# Patient Record
Sex: FEMALE | Race: BLACK OR AFRICAN AMERICAN | Employment: PART TIME | URBAN - METROPOLITAN AREA
[De-identification: names, ages, dates, MRNs, and addresses within clinical notes are randomized per-mention and may not be internally consistent; named-entity substitution may affect disease eponyms.]

---

## 2022-03-25 ENCOUNTER — HOSPITAL ENCOUNTER (EMERGENCY)
Age: 57
Discharge: HOME | End: 2022-03-25
Attending: EMERGENCY MEDICINE
Payer: COMMERCIAL

## 2022-03-25 VITALS
HEART RATE: 89 BPM | BODY MASS INDEX: 39.27 KG/M2 | SYSTOLIC BLOOD PRESSURE: 165 MMHG | TEMPERATURE: 99.3 F | RESPIRATION RATE: 18 BRPM | DIASTOLIC BLOOD PRESSURE: 103 MMHG | HEIGHT: 64 IN | OXYGEN SATURATION: 100 % | WEIGHT: 230 LBS

## 2022-03-25 DIAGNOSIS — S43.422A SPRAIN OF LEFT ROTATOR CUFF CAPSULE, INITIAL ENCOUNTER: Primary | ICD-10-CM

## 2022-03-25 PROCEDURE — 99283 EMERGENCY DEPT VISIT LOW MDM: CPT

## 2022-03-25 RX ORDER — HYDROCHLOROTHIAZIDE 25 MG/1
TABLET ORAL
COMMUNITY

## 2022-03-25 RX ORDER — KETOROLAC TROMETHAMINE 10 MG/1
10 TABLET, FILM COATED ORAL
Qty: 20 TABLET | Refills: 0 | Status: SHIPPED | OUTPATIENT
Start: 2022-03-25 | End: 2022-03-30

## 2022-03-25 NOTE — ED PROVIDER NOTES
EMERGENCY DEPARTMENT HISTORY AND PHYSICAL EXAM      Date: 3/25/2022  Patient Name: Nichole Cee    History of Presenting Illness     Chief Complaint   Patient presents with    Shoulder Pain       History Provided By: Patient    HPI: Nichole Cee, 64 y.o. female with a past medical history significant hypertension presents to the ED with cc of 3 weeks history of left lateral shoulder pain. Patient denies trauma. Patient reports the pain is sharp, worse with movement or palpation, improved with rest.  Patient denies numbness or tingling. There are no other complaints, changes, or physical findings at this time. PCP: Grisel Vu MD    No current facility-administered medications on file prior to encounter. Current Outpatient Medications on File Prior to Encounter   Medication Sig Dispense Refill    hydroCHLOROthiazide (HYDRODIURIL) 25 mg tablet 1 tablet in the morning         Past History     Past Medical History:  Past Medical History:   Diagnosis Date    Hypertension        Past Surgical History:  History reviewed. No pertinent surgical history. Family History:  History reviewed. No pertinent family history. Social History:  Social History     Tobacco Use    Smoking status: Never Smoker    Smokeless tobacco: Never Used   Substance Use Topics    Alcohol use: Yes     Comment: occassional    Drug use: Never       Allergies:  No Known Allergies      Review of Systems   Review of Systems   Constitutional: Negative for chills and fever. HENT: Negative for sinus pressure and sinus pain. Eyes: Negative for photophobia and redness. Respiratory: Negative for shortness of breath and wheezing. Cardiovascular: Negative for chest pain and palpitations. Gastrointestinal: Negative for abdominal pain and nausea. Genitourinary: Negative for flank pain and hematuria. Musculoskeletal: Positive for left lateral shoulder pain, negative for gait problem.    Skin: Negative for color change and pallor. Neurological: Negative for dizziness and weakness. Review of Systems    Physical Exam   Physical Exam  Constitutional:       General: No acute distress. Appearance: Normal appearance. Not toxic-appearing. HENT:      Head: Normocephalic and atraumatic. Nose: Nose normal.      Mouth/Throat:      Mouth: Mucous membranes are moist.   Eyes:      Extraocular Movements: Extraocular movements intact. Pupils: Pupils are equal, round, and reactive to light. Cardiovascular:      Rate and Rhythm: Normal rate. Pulses: Normal pulses. Pulmonary:      Effort: Pulmonary effort is normal.      Breath sounds: No stridor. Abdominal:      General: Abdomen is flat. There is no distension. Musculoskeletal:         General: Normal range of motion. Left upper extremity: Tenderness to palpation over the insertion of the rotator cuff muscles. Pain elicited with movement of joint. Neurovascularly intact distally. Skin:     General: Skin is warm and dry. Capillary Refill: Capillary refill takes less than 2 seconds. Neurological:      General: No focal deficit present. Mental Status: Aert and oriented to person, place, and time. Psychiatric:         Mood and Affect: Mood normal.         Behavior: Behavior normal.       Physical Exam    Lab and Diagnostic Study Results     Labs -   No results found for this or any previous visit (from the past 12 hour(s)). Radiologic Studies -   @lastxrresult@  CT Results  (Last 48 hours)    None        CXR Results  (Last 48 hours)    None            Medical Decision Making   - I am the first provider for this patient. - I reviewed the vital signs, available nursing notes, past medical history, past surgical history, family history and social history. - Initial assessment performed. The patients presenting problems have been discussed, and they are in agreement with the care plan formulated and outlined with them.   I have encouraged them to ask questions as they arise throughout their visit. Vital Signs-Reviewed the patient's vital signs. Patient Vitals for the past 12 hrs:   Temp Pulse Resp BP SpO2   03/25/22 0821 99.3 °F (37.4 °C) 89 18 (!) 165/103 100 %         Disposition   Disposition: DC- Adult Discharges: All of the diagnostic tests were reviewed and questions answered. Diagnosis, care plan and treatment options were discussed. The patient understands the instructions and will follow up as directed. The patients results have been reviewed with them. They have been counseled regarding their diagnosis. The patient verbally convey understanding and agreement of the signs, symptoms, diagnosis, treatment and prognosis and additionally agrees to follow up as recommended with their PCP in 24 - 48 hours. They also agree with the care-plan and convey that all of their questions have been answered. I have also put together some discharge instructions for them that include: 1) educational information regarding their diagnosis, 2) how to care for their diagnosis at home, as well a 3) list of reasons why they would want to return to the ED prior to their follow-up appointment, should their condition change. Discharged    DISCHARGE PLAN:  1. Current Discharge Medication List      CONTINUE these medications which have NOT CHANGED    Details   hydroCHLOROthiazide (HYDRODIURIL) 25 mg tablet 1 tablet in the morning           2. Follow-up Information     Follow up With Specialties Details Why Contact Info    Rodrick Valderrama MD Internal Medicine  As needed [phone] [facility]      Vikram Dang MD Orthopedic Surgery  As needed [place] [address]  [phone]          3. Return to ED if worse   4.    Current Discharge Medication List      START taking these medications    Details   ketorolac (TORADOL) 10 mg tablet Take 1 Tablet by mouth every six (6) hours as needed for Pain for up to 5 days. Qty: 20 Tablet, Refills: 0  Start date: 3/25/2022, End date: 3/30/2022               Diagnosis     Clinical Impression:   1. Sprain of left rotator cuff capsule, initial encounter        Attestations:    Leti Mendez MD    Please note that this dictation was completed with [employer], the computer voice recognition software. Quite often unanticipated grammatical, syntax, homophones, and other interpretive errors are inadvertently transcribed by the computer software. Please disregard these errors. Please excuse any errors that have escaped final proofreading. Thank you.

## 2022-03-25 NOTE — Clinical Note
[facility] EMERGENCY DEPARTMENT  [address] [place] [address]  [phone]    Work/School Note    Date: 3/25/2022    To Whom It May concern:    Ramandeep Malcolm was seen and treated today in the emergency room by the following provider(s):  Attending Provider: Malena Moseley MD.      Ramandeep Malcolm is excused from work/school on 3/25/2022 through 3/27/2022. She is medically clear to return to work/school on 3/28/2022.          Sincerely,          Nataly Alonzo MD

## 2022-03-25 NOTE — ED TRIAGE NOTES
x3 wks left shoulder pain, hurts to lift arm and to extend arm forward, doesn't hurt to pull arm towards back, dull achy pain, 10/10 constants but hurts worse to push arm forward and lifting arm up.   PMS good, equal strength and , radial pulse 2+, denies trauma, hasn't taken anything for pain

## 2024-01-12 ENCOUNTER — HOSPITAL ENCOUNTER (OUTPATIENT)
Facility: HOSPITAL | Age: 59
Setting detail: RECURRING SERIES
Discharge: HOME | End: 2024-01-15
Payer: COMMERCIAL

## 2024-01-12 PROCEDURE — 97161 PT EVAL LOW COMPLEX 20 MIN: CPT | Performed by: PHYSICAL THERAPIST

## 2024-01-12 NOTE — THERAPY EVALUATION
[facility]  [address], [address]  [place] [address]  Phone: [phone]    Fax: [phone]         PHYSICAL THERAPY - MEDICARE EVALUATION/PLAN OF CARE NOTE (updated 3/23)      Date: 2024          Patient Name:  Shelly Cameron :  1965   Medical   Diagnosis:  Neck pain [M54.2]  Other low back pain [M54.59] Treatment Diagnosis:  M54.2  NECK PAIN and M54.59  OTHER LOWER BACK PAIN    Referral Source:  Hardik Emanuel MD Provider #:  [id number]                Insurance: Payor: [employer] / Plan: [employer] [facility] / Product Type: *No Product type* /      Patient  verified yes     Visit #   Current  / Total 1 To be determined   Time   In / Out 1:30 pm  205 pm   Total Treatment Time 35 minutes   Total Timed Codes 0   1:1 Treatment Time 0       BC Totals Reminder:  bill using total billable   min of TIMED therapeutic procedures and modalities.   8-22 min = 1 unit; 23-37 min = 2 units; 38-52 min = 3 units;  53-67 min = 4 units; 68-82 min = 5 units           SUBJECTIVE  Pain Level (0-10 scale): 6  []constant []intermittent []improving []worsening [x]no change since onset    Any medication changes, allergies to medications, adverse drug reactions, diagnosis change, or new procedure performed?: [x] No    [] Yes (see summary sheet for update)  Medications: Verified on Patient Summary List    Subjective functional status/changes:     Chief complaint of central neck and back pain.  Described pain as a dull ache, \"tight\".  Some difficulty getting to sleep.  Denies numbness or tingling.   Right hand dominate.        Aggravating factors:  sitting > 30 minutes.   Relieving factors: heating pad, muscle relaxer.      Start of Care: 2024  Onset Date: 2024.    Current symptoms/Complaints: see above.   Mechanism of Injury: see above.   PLOF: no difficulties.   Limitations to PLOF/Activity or Recreational Limitations: limited sitting and walking.

## 2024-01-17 ENCOUNTER — HOSPITAL ENCOUNTER (OUTPATIENT)
Facility: HOSPITAL | Age: 59
Setting detail: RECURRING SERIES
Discharge: HOME | End: 2024-01-20
Payer: COMMERCIAL

## 2024-01-17 PROCEDURE — 97110 THERAPEUTIC EXERCISES: CPT | Performed by: PHYSICAL THERAPIST

## 2024-01-17 PROCEDURE — 97140 MANUAL THERAPY 1/> REGIONS: CPT | Performed by: PHYSICAL THERAPIST

## 2024-01-17 NOTE — PROGRESS NOTES
PHYSICAL THERAPY - MEDICARE DAILY TREATMENT NOTE (updated 3/23)      Date: 2024          Patient Name:  Shelly Cameron :  1965   Medical   Diagnosis:  Neck pain [M54.2]  Other low back pain [M54.59] Treatment Diagnosis:  M54.2  NECK PAIN and M54.59  OTHER LOWER BACK PAIN    Referral Source:  Hardik Emanuel MD Insurance:   Payor: [facility] / Plan: [facility] [place] / Product Type: *No Product type* /                     Patient  verified yes     Visit #   Current  / Total 2 4   Time   In / Out 8 am  850 am    Total Treatment Time 50 minutes.    Total Timed Codes 40   1:1 Treatment Time 40      [facility] Totals Reminder:  bill using total billable   min of TIMED therapeutic procedures and modalities.   8-22 min = 1 unit; 23-37 min = 2 units; 38-52 min = 3 units; 53-67 min = 4 units; 68-82 min = 5 units            SUBJECTIVE    Pain Level (0-10 scale): 7    Any medication changes, allergies to medications, adverse drug reactions, diagnosis change, or new procedure performed?: [x] No    [] Yes (see summary sheet for update)  Medications: Verified on Patient Summary List    Subjective functional status/changes:     Not use to exercising.   Reports she has been trying to get out of chair more while at work.     OBJECTIVE      Therapeutic Procedures:  Tx Min Billable or 1:1 Min (if diff from Tx Min) Procedure, Rationale, Specifics   10  02072 Manual Therapy (timed):  decrease pain and increase ROM to improve patient's ability to progress to PLOF and address remaining functional goals.  The manual therapy interventions were performed at a separate and distinct time from the therapeutic activities interventions . (see flow sheet as applicable)     Details if applicable:  STM/PA glides to b/l TL paraspinals.    30  36581 Therapeutic Exercise (timed):  increase ROM, strength, coordination, balance, and proprioception to improve patient's ability to progress to PLOF and address remaining functional goals. (see flow

## 2024-01-23 ENCOUNTER — HOSPITAL ENCOUNTER (OUTPATIENT)
Facility: HOSPITAL | Age: 59
Setting detail: RECURRING SERIES
Discharge: HOME | End: 2024-01-26
Payer: COMMERCIAL

## 2024-01-23 PROCEDURE — 97140 MANUAL THERAPY 1/> REGIONS: CPT

## 2024-01-23 PROCEDURE — 97110 THERAPEUTIC EXERCISES: CPT

## 2024-01-23 NOTE — PROGRESS NOTES
PHYSICAL THERAPY - MEDICARE DAILY TREATMENT NOTE (updated 3/23)      Date: 2024          Patient Name:  Shelly Cameron :  1965   Medical   Diagnosis:  Neck pain [M54.2]  Other low back pain [M54.59] Treatment Diagnosis:  M54.2  NECK PAIN and M54.59  OTHER LOWER BACK PAIN    Referral Source:  Hardik Emanuel MD Insurance:   Payor: [facility] / Plan: [facility] [place] / Product Type: *No Product type* /                     Patient  verified yes     Visit #   Current  / Total 3 4   Time   In / Out 145 240   Total Treatment Time 50 minutes.    Total Timed Codes 41   1:1 Treatment Time 41      [facility] Totals Reminder:  bill using total billable   min of TIMED therapeutic procedures and modalities.   8-22 min = 1 unit; 23-37 min = 2 units; 38-52 min = 3 units; 53-67 min = 4 units; 68-82 min = 5 units            SUBJECTIVE    Pain Level (0-10 scale): 4    Any medication changes, allergies to medications, adverse drug reactions, diagnosis change, or new procedure performed?: [x] No    [] Yes (see summary sheet for update)  Medications: Verified on Patient Summary List    Subjective functional status/changes:     \"I'm not too bad today.\"    OBJECTIVE      Therapeutic Procedures:  Tx Min Billable or 1:1 Min (if diff from Tx Min) Procedure, Rationale, Specifics   8  83161 Manual Therapy (timed):  decrease pain and increase ROM to improve patient's ability to progress to PLOF and address remaining functional goals.  The manual therapy interventions were performed at a separate and distinct time from the therapeutic activities interventions . (see flow sheet as applicable)     Details if applicable:  STM/PA glides to b/l TL paraspinals.    33  24899 Therapeutic Exercise (timed):  increase ROM, strength, coordination, balance, and proprioception to improve patient's ability to progress to PLOF and address remaining functional goals. (see flow sheet as applicable)     Details if applicable:           Details if

## 2024-01-31 ENCOUNTER — HOSPITAL ENCOUNTER (OUTPATIENT)
Facility: HOSPITAL | Age: 59
Setting detail: RECURRING SERIES
Discharge: HOME | End: 2024-02-03
Payer: COMMERCIAL

## 2024-01-31 PROCEDURE — 97110 THERAPEUTIC EXERCISES: CPT | Performed by: PHYSICAL THERAPIST

## 2024-01-31 PROCEDURE — 97140 MANUAL THERAPY 1/> REGIONS: CPT | Performed by: PHYSICAL THERAPIST

## 2024-01-31 NOTE — PROGRESS NOTES
[facility]  [address], [address]  [place] [address]  Phone: [phone]    Fax: [phone]    PHYSICAL THERAPY PROGRESS NOTE  Patient Name:  Shelly Cameron :  1965   Treatment/Medical Diagnosis: Neck pain [M54.2]  Other low back pain [M54.59]   Referral Source:  Hardik Emanuel MD     Date of Initial Visit:  2023 Attended Visits:  4 Missed Visits:  0     SUMMARY OF TREATMENT/ASSESSMENT:  ROM, light core strengthening, STM    CURRENT STATUS      Short Term Goals: To be accomplished in 8 treatments.  Independent with HEP progressing 2024  Restore pain free AROM neck and TL spine. < 2/10, progressing 2024  On/off bed, rolling in bed without difficutly  Patient can sit x 60 minutes without significant pain , MET 2024  Long Term Goals: To be accomplished in 16 treatments.   Speed of gait:  > 1.0 meters/second   Patient reports she is back to baseline. Progressing 2024    RECOMMENDATIONS  Request additional visits from insurance company 4.          April Evita Aragon, PT       2024       12:19 PM    If you have any questions/comments please contact us directly at [phone].   Thank you for allowing us to assist in the care of your patient.

## 2024-01-31 NOTE — PROGRESS NOTES
[facility]  [address], [address]  [place] [address]  Phone: [phone]    Fax: [phone]    PHYSICAL THERAPY PROGRESS NOTE  Patient Name:  Shelly Cameron :  1965   Treatment/Medical Diagnosis: Neck pain [M54.2]  Other low back pain [M54.59]   Referral Source:  Hardik Emanuel MD     Date of Initial Visit:  2023 Attended Visits:  4 Missed Visits:  0     SUMMARY OF TREATMENT/ASSESSMENT:  ROM, light core strengthening, STM    CURRENT STATUS      Short Term Goals: To be accomplished in 8 treatments.  Independent with HEP progressing 2024  Restore pain free AROM neck and TL spine. < 2/10, progressing 2024  On/off bed, rolling in bed without difficutly  Patient can sit x 60 minutes without significant pain , MET 2024  Long Term Goals: To be accomplished in 16 treatments.   Speed of gait:  > 1.0 meters/second   Patient reports she is back to baseline. Progressing 2024    RECOMMENDATIONS  Request additional visits from insurance company 4.          April Evita Aragon, PT       2024       12:14 PM    If you have any questions/comments please contact us directly at [phone].   Thank you for allowing us to assist in the care of your patient.

## 2024-01-31 NOTE — PROGRESS NOTES
PHYSICAL THERAPY - MEDICARE DAILY TREATMENT NOTE (updated 3/23)      Date: 2024          Patient Name:  Shelly Cameron :  1965   Medical   Diagnosis:  Neck pain [M54.2]  Other low back pain [M54.59] Treatment Diagnosis:  M54.2  NECK PAIN and M54.59  OTHER LOWER BACK PAIN    Referral Source:  Hardik Emanuel MD Insurance:   Payor: [facility] / Plan: [facility] [place] / Product Type: *No Product type* /                     Patient  verified yes     Visit #   Current  / Total 3 4   Time   In / Out 1030 am  1125 am    Total Treatment Time 55 minutes.    Total Timed Codes 42   1:1 Treatment Time 42      [facility] Totals Reminder:  bill using total billable   min of TIMED therapeutic procedures and modalities.   8-22 min = 1 unit; 23-37 min = 2 units; 38-52 min = 3 units; 53-67 min = 4 units; 68-82 min = 5 units            SUBJECTIVE    Pain Level (0-10 scale): 4    Any medication changes, allergies to medications, adverse drug reactions, diagnosis change, or new procedure performed?: [x] No    [] Yes (see summary sheet for update)  Medications: Verified on Patient Summary List    Subjective functional status/changes:     Patient reports that is gradually improving.  Trying to sit less at work < 30 minutes.       OBJECTIVE      Therapeutic Procedures:  Tx Min Billable or 1:1 Min (if diff from Tx Min) Procedure, Rationale, Specifics   10  38455 Manual Therapy (timed):  decrease pain and increase ROM to improve patient's ability to progress to PLOF and address remaining functional goals.  The manual therapy interventions were performed at a separate and distinct time from the therapeutic activities interventions . (see flow sheet as applicable)     Details if applicable:  STM/PA glides to b/l TL paraspinals.    32  61999 Therapeutic Exercise (timed):  increase ROM, strength, coordination, balance, and proprioception to improve patient's ability to progress to PLOF and address remaining functional goals. (see flow

## 2024-01-31 NOTE — PROGRESS NOTES
[facility]  [address], [address]  [place] [address]  Phone: [phone]    Fax: [phone]    PHYSICAL THERAPY PROGRESS NOTE  Patient Name:  Shelly Cameron :  1965   Treatment/Medical Diagnosis: Neck pain [M54.2]  Other low back pain [M54.59]   Referral Source:  Hardik Emanuel MD     Date of Initial Visit:  2023 Attended Visits:  4 Missed Visits:  0     SUMMARY OF TREATMENT/ASSESSMENT:  ROM, light core strengthening, STM    CURRENT STATUS      Short Term Goals: To be accomplished in 8 treatments.  Independent with HEP progressing 2024  Restore pain free AROM neck and TL spine. < 2/10, progressing 2024  On/off bed, rolling in bed without difficutly  Patient can sit x 60 minutes without significant pain , MET 2024  Long Term Goals: To be accomplished in 16 treatments.   Speed of gait:  > 1.0 meters/second   Patient reports she is back to baseline. Progressing 2024    RECOMMENDATIONS  Request additional visits from insurance company 4.          April Evita Aragon, PT       2024       12:19 PM    If you have any questions/comments please contact us directly at [phone].   Thank you for allowing us to assist in the care of your patient.